# Patient Record
Sex: FEMALE | Race: WHITE | Employment: FULL TIME | URBAN - METROPOLITAN AREA
[De-identification: names, ages, dates, MRNs, and addresses within clinical notes are randomized per-mention and may not be internally consistent; named-entity substitution may affect disease eponyms.]

---

## 2022-08-16 ENCOUNTER — HOSPITAL ENCOUNTER (EMERGENCY)
Age: 22
Discharge: HOME | End: 2022-08-16
Attending: EMERGENCY MEDICINE
Payer: COMMERCIAL

## 2022-08-16 ENCOUNTER — APPOINTMENT (OUTPATIENT)
Dept: GENERAL RADIOLOGY | Age: 22
End: 2022-08-16
Payer: COMMERCIAL

## 2022-08-16 VITALS
HEIGHT: 64 IN | RESPIRATION RATE: 17 BRPM | OXYGEN SATURATION: 98 % | BODY MASS INDEX: 26.63 KG/M2 | TEMPERATURE: 98 F | HEART RATE: 94 BPM | DIASTOLIC BLOOD PRESSURE: 89 MMHG | SYSTOLIC BLOOD PRESSURE: 122 MMHG | WEIGHT: 156 LBS

## 2022-08-16 DIAGNOSIS — S61.315A LACERATION OF LEFT RING FINGER WITHOUT FOREIGN BODY WITH DAMAGE TO NAIL, INITIAL ENCOUNTER: ICD-10-CM

## 2022-08-16 DIAGNOSIS — S62.635B OPEN DISPLACED FRACTURE OF DISTAL PHALANX OF LEFT RING FINGER, INITIAL ENCOUNTER: Primary | ICD-10-CM

## 2022-08-16 PROCEDURE — 6370000000 HC RX 637 (ALT 250 FOR IP): Performed by: EMERGENCY MEDICINE

## 2022-08-16 PROCEDURE — 73130 X-RAY EXAM OF HAND: CPT

## 2022-08-16 PROCEDURE — 96372 THER/PROPH/DIAG INJ SC/IM: CPT

## 2022-08-16 PROCEDURE — 6360000002 HC RX W HCPCS: Performed by: EMERGENCY MEDICINE

## 2022-08-16 PROCEDURE — 99284 EMERGENCY DEPT VISIT MOD MDM: CPT

## 2022-08-16 RX ORDER — CEPHALEXIN 500 MG/1
500 CAPSULE ORAL 4 TIMES DAILY
Qty: 28 CAPSULE | Refills: 0 | Status: SHIPPED | OUTPATIENT
Start: 2022-08-16 | End: 2022-08-23

## 2022-08-16 RX ORDER — IBUPROFEN 600 MG/1
600 TABLET ORAL ONCE
Status: COMPLETED | OUTPATIENT
Start: 2022-08-16 | End: 2022-08-16

## 2022-08-16 RX ORDER — OXYCODONE HYDROCHLORIDE 5 MG/1
5 TABLET ORAL EVERY 8 HOURS PRN
Qty: 9 TABLET | Refills: 0 | Status: SHIPPED | OUTPATIENT
Start: 2022-08-16 | End: 2022-08-19

## 2022-08-16 RX ORDER — FENTANYL CITRATE 50 UG/ML
50 INJECTION, SOLUTION INTRAMUSCULAR; INTRAVENOUS ONCE
Status: COMPLETED | OUTPATIENT
Start: 2022-08-16 | End: 2022-08-16

## 2022-08-16 RX ORDER — CEFAZOLIN SODIUM 1 G/3ML
1000 INJECTION, POWDER, FOR SOLUTION INTRAMUSCULAR; INTRAVENOUS ONCE
Status: COMPLETED | OUTPATIENT
Start: 2022-08-16 | End: 2022-08-16

## 2022-08-16 RX ADMIN — CEFAZOLIN 1000 MG: 1 INJECTION, POWDER, FOR SOLUTION INTRAMUSCULAR; INTRAVENOUS at 13:54

## 2022-08-16 RX ADMIN — IBUPROFEN 600 MG: 600 TABLET, FILM COATED ORAL at 12:57

## 2022-08-16 RX ADMIN — FENTANYL CITRATE 50 MCG: 50 INJECTION INTRAMUSCULAR; INTRAVENOUS at 13:15

## 2022-08-16 ASSESSMENT — PAIN SCALES - GENERAL
PAINLEVEL_OUTOF10: 7
PAINLEVEL_OUTOF10: 7
PAINLEVEL_OUTOF10: 3
PAINLEVEL_OUTOF10: 3
PAINLEVEL_OUTOF10: 2

## 2022-08-16 ASSESSMENT — PAIN - FUNCTIONAL ASSESSMENT: PAIN_FUNCTIONAL_ASSESSMENT: 0-10

## 2022-08-16 ASSESSMENT — PAIN DESCRIPTION - LOCATION
LOCATION: FINGER (COMMENT WHICH ONE)
LOCATION: FINGER (COMMENT WHICH ONE)

## 2022-08-16 ASSESSMENT — PAIN DESCRIPTION - ORIENTATION
ORIENTATION: LEFT
ORIENTATION: LEFT

## 2022-08-16 NOTE — LETTER
[facility] [address]  Phone: [phone]               August 16, 2022    Patient: Burton Mercer   YOB: 2000   Date of Visit: 8/16/2022       To Whom It May Concern:    Burton Mercer was seen and treated in our emergency department on 8/16/2022. No heavy lifting until cleared by orthopaedics.      Sincerely,             Signature:__________________________________

## 2022-08-16 NOTE — ED PROVIDER NOTES
in the morning and 1 capsule at noon and 1 capsule in the evening and 1 capsule before bedtime. Do all this for 7 days. 28 capsule 0     No Known Allergies    REVIEW OF SYSTEMS  10 systems reviewed, pertinent positives and negatives per HPI, otherwise noted to be negative. PHYSICAL EXAM  ED Triage Vitals [08/16/22 1230]   BP Temp Temp Source Heart Rate Resp SpO2 Height Weight   (!) 138/94 98 °F (36.7 °C) Tympanic (!) 110 17 98 % -- --     General appearance: Awake and alert. Cooperative. Tearful. HENT: Normocephalic. Atraumatic. Mucous membranes are moist.  Neck: Supple. Eyes: PERRL. EOMI. Heart/Chest: Tachy rate, regular rhythm. No murmurs. Lungs: Respirations unlabored. CTAB. Good air exchange. Speaking comfortably in full sentences. Abdomen: Soft. Non-tender. Non-distended. No rebound or guarding. Musculoskeletal: L hand: there is an injury to the distal left index finger w/ associated edema and laceration extending to the nail bed:        Skin: Warm and dry. No acute rashes except as above. Neurological: Alert and oriented. CN II-XII intact. Strength 5/5 bilateral upper and lower extremities. Sensation intact to light touch. Gait normal.  Psychiatric: Mood/affect: normal      LABS  I have reviewed all labs for this visit. No results found for this visit on 08/16/22. RADIOLOGY  I have reviewed all radiographic studies for this visit. XR HAND LEFT (MIN 3 VIEWS)   Final Result   Acute displaced fracture of the tuft of the left 4th digit with overlying   soft tissue swelling. ED COURSE/MDM  Patient seen and evaluated. Old records reviewed. Labs and imaging reviewed and results discussed with patient/family to extent possible. This is a 79-year-old female presenting with crush injury/laceration to the left fourth digit. X-ray shows an acute displaced fracture of the tuft of the left fourth digit with overlying soft tissue swelling. This is an open injury.   The laceration extends to the nailbed. Tetanus status is up-to-date. Ibuprofen and fentanyl for pain control. Ancef for prophylaxis. Wound was cleaned. The fingernail was removed and the nailbed laceration was repaired. The laceration lateral to the nailbed was repaired. The removed fingernail was cleaned and then replaced in the eponychial fold and secured with Dermabond. A dressing was applied. A splint was applied. Case was discussed with orthopedic surgery. Follow-up with Dr. Arlen Mena in the outpatient setting. Keflex for prophylaxis. Ibuprofen/APAP for pain control with oxycodone for breakthrough pain. Usual opiate precautions. Usual strict return precautions for new or worsening symptoms communicated. Is this patient to be included in the SEP-1 Core Measure? No   Exclusion criteria - the patient is NOT to be included for SEP-1 Core Measure due to: Infection is not suspected    Lorenzo MANDUJANO MD, am the primary clinician of record. During the patient's ED course, the patient was given:  Medications   ibuprofen (ADVIL;MOTRIN) tablet 600 mg (600 mg Oral Given 8/16/22 1257)   fentaNYL (SUBLIMAZE) injection 50 mcg (50 mcg IntraMUSCular Given 8/16/22 1315)   ceFAZolin (ANCEF) injection 1,000 mg (1,000 mg IntraMUSCular Given 8/16/22 1354)        All questions were answered and the patient/family expressed understanding and agreement with the plan. PROCEDURES  Laceration repair and removal of fingernail, left fourth digit  Informed consent was obtained by patient and her father, present at bedside. The wound was prepped and draped in the usual sterile fashion. A digital nerve block was performed with a 27-gauge needle and 1% lidocaine without epinephrine to good effect. Hemostasis was achieved with use of a finger tourniquet. As there was a nailbed laceration, the fingernail of the affected digit was undermined with suture scissors and removed with traction.   The nailbed laceration was repaired with simple interrupted 5-0 Chromic Gut sutures. The laceration extended lateral to the fingernail bed and was repaired with simple interrupted 5-0 Prolene sutures. The removed fingernail was cleaned and trimmed and placed in the eponychial fold and secured with Dermabond. The finger tourniquet was removed and hemostasis was noted. The wound was dressed with tube gauze. The digit was placed in an aluminum splint. Patient tolerated the procedure well with no immediate complications. EBL <5 mL. CRITICAL CARE  N/A    CLINICAL IMPRESSION  1. Open displaced fracture of distal phalanx of left ring finger, initial encounter    2. Laceration of left ring finger without foreign body with damage to nail, initial encounter        DISPOSITION   discharge     Mary Jane Guzman MD    Note: This chart was created using voice recognition dictation software. Efforts were made by me to ensure accuracy, however some errors may be present due to limitations of this technology and occasionally words are not transcribed correctly.         Mary Jane Guzman MD  08/16/22 9353 done

## 2022-08-16 NOTE — ED NOTES
Pt discharged from ED to home. Pt verbalizes understanding to discharge instructions, teach back successful. Pt denies questions at this time. No acute distress noted. Resp even and unlabored. A/ox4. Pt instructed to follow-up as noted - return to ED if symptoms worsen. Pt verbalizes understanding. Discharged per ED MD with discharge instructions. Pt refuses ambulatory assistance to lobby and walks with steady gait.         Cathryne Barthel, RN  08/16/22 2771

## 2022-08-19 ENCOUNTER — OFFICE VISIT (OUTPATIENT)
Dept: ORTHOPEDIC SURGERY | Age: 22
End: 2022-08-19
Payer: COMMERCIAL

## 2022-08-19 VITALS — WEIGHT: 156 LBS | HEIGHT: 64 IN | RESPIRATION RATE: 16 BRPM | BODY MASS INDEX: 26.63 KG/M2

## 2022-08-19 DIAGNOSIS — S62.635A CLOSED DISPLACED FRACTURE OF DISTAL PHALANX OF LEFT RING FINGER, INITIAL ENCOUNTER: Primary | ICD-10-CM

## 2022-08-19 PROCEDURE — 99204 OFFICE O/P NEW MOD 45 MIN: CPT | Performed by: PHYSICIAN ASSISTANT

## 2022-08-19 PROCEDURE — 26750 TREAT FINGER FRACTURE EACH: CPT | Performed by: PHYSICIAN ASSISTANT

## 2022-08-19 NOTE — PROGRESS NOTES
Ms. Manjit Hidalgo is a 25 y.o. left handed student  who is seen today in Hand Surgical Consultation at the request of Sang Ramirez MD.    She is seen today regarding an injury occurring on August 16th, 2022. She reports injuring her left Ring Finger, having  smashed it between a boat and dock while at work. At the time of injury, there was not clear dislocation or malposition of the finger. She was seen for Emergency evaluation elsewhere, radiographs were obtained & she has been immobilized. By report, her skin injury was repaired after the wound was thoroughly cleansed. The nail structures were involved in the injury and did require repair at the time. She reports mild pain located in the Distal aspect of the Ring Finger, no tenderness of the wrist or elbow. She notes today, no neurologic symptoms in the Ring Finger. Symptoms are improving over time. I have today reviewed with Manjit Hidalgo the clinically relevant, past medical history, medications, allergies,  family history, social history, and Review Of Systems & I have documented any details relevant to today's presenting complaints in my history above. Ms. Bradley Reno's self-reported past medical history, medications, allergies,  family history, social history, and Review Of Systems have been scanned into the chart under the \"Media\" tab. Physical Exam:  Ms. Nickie Villarreal most recent vitals:  Vitals  Resp: 16  Height: 5' 4\" (162.6 cm)  Weight: 156 lb (70.8 kg)    She is well nourished, oriented to person, place & time. She demonstrates appropriate mood and affect as well as normal gait and station. Skin: There is Oblique 1.5cm laceration on the Radial left Ring Finger closest to the Left, Ring Finger, DIP Joint which has been sutured. The nail plate is present but is resting on top of the nail bed not attached to anything. No other digits show sign of skin injury bilaterally.   Digital range of motion is limited by pain in the injured digit on the Left, normal on the Right. FDS function is Intact, FDP function is Intact, common extensor function is Intact. Wrist range of motion is Full bilaterally. Sensation is subjectively tingling in the zone of injury, objectively normal.  All other digits demonstrate normal sensation bilaterally. Vascular examination reveals normal, good capillary refill, and good color bilaterally. There is mild acute ecchymosis. Swelling is mild in the Ring Finger, all other digits demonstrate no evidence of swelling bilaterally. There is no evidence of gross joint instability bilaterally. Muscular strength is clinically appropriate bilaterally. Maximal pain is elicited with palpation of the distal region of the Ring Finger about the Left, Ring Finger, DIP Joint. The base of the hand & wrist are not tender to palpation. There is a 0 degree angular deformity and no clinical evidence of  mal-rotation of the injured digit. Radiographic Evaluation:  Radiographs, taken From a Hospital location outside of my practice were Personally Reviewed & Interpreted by myself today (2 views of the left Ring Finger). They do demonstrate evidence of an acute fracture of the Distal Phalanx of the Ring Finger. There is mild comminution, 0 degrees of angular deformity and no  mal-rotation. There is not evidence of other injury or bony fracture. Impression:  Ms. Raghav Puckett has sustained recent left Ring Finger injury with associated fracture. She  presents requesting further treatment. Plan:  I  have discussed with Ms. Raghav Puckett the various treatment options for treatment of her left Ring Finger tip injury.   We discussed the options of Conservative management allowing the soft tissues and bony structures to heal as well as possible and the functional consequences thereof, and Surgical Management in the form of Irrigation and debridement of the wound and associated injuries, and the repair of injured structures as possible (including skin, bone, nail structures, etc.). She has elected to proceed conservatively, voicing an understanding of the other options available to her. I have explained the complications, limitations, expectations, alternatives, & risks of her chosen treatment. We discussed the possibility of residual symptoms as may be related to conservative treatment of fractures including the possibilities of: infection, poor healing of bone, skin, nail bed, and other structures, persistant deformity, persistant pain, persistent sensory loss, limitation of motion, future arthritic symptoms & the possible need for further treatment. She understood our discussion and was comfortable with her decision; she was provided with appropriate expectations. I have discussed with Ms. Burton Mercer the various treatment options for treatment of her left Ring Finger Distal Phalanx fracture. We discussed the options of Conservative management of the fracture (accepting its current position and the functional consequences thereof), attempted closed reduction & cast immobilization (and the limitations which go along with cast immobilization), and Surgical Management in the form of Closed Reduction & Percutaneous Pinning  and/or Open Reduction & Internal Fixation of the fracture. She has elected to proceed conservatively, voicing an understanding of the other options available to her. I have explained the complications, limitations, expectations, alternatives, & risks of her chosen treatment. We discussed the possibility of residual symptoms as may be related to conservative treatment of fractures including the possibilities of: mal-union, non-union, delayed union, persistent deformity, persistent pain, limitation of motion, future arthritic symptoms & the possible need for further treatment. She understood our discussion and was comfortable with her decision; she was provided with appropriate expectations.     She is today fitted with a carefully applied, well padded & appropriately molded  stack splint . She was given a Patient Instruction Sheet related to cast care. I have asked her to schedule a follow-up appointment for 2 weeks from now at which time we will obtain repeat radiographs of the Ring Finger  out of splint/cast.    She is specifically instructed to contact the office between now & her scheduled appointment if she has concerns related to the cast or the underlying injury. She is welcome to call for an appointment sooner if she has any additional concerns or questions. I have also discussed with Ms. Claire Sharma  the other treatment options available to her  for this condition. We have today selected to proceed with conservative management. She and I have agreed that if our current course of conservative treatment does not prove to be effective over the short term future, that she will schedule a follow-up appointment to discuss and select an alternate course of therapy including possibly injection or surgical treatment. Ms. Claire Sharma has been given a full verbal list of instructions and precautions related to her present condition. I have asked her to followup with me in the office at the prescribed time. She is also specifically requested to call or return to the office sooner if her symptoms change or worsen prior to the next scheduled appointment.

## 2022-09-02 ENCOUNTER — OFFICE VISIT (OUTPATIENT)
Dept: ORTHOPEDIC SURGERY | Age: 22
End: 2022-09-02

## 2022-09-02 VITALS — WEIGHT: 154 LBS | BODY MASS INDEX: 26.29 KG/M2 | RESPIRATION RATE: 16 BRPM | HEIGHT: 64 IN

## 2022-09-02 DIAGNOSIS — S62.635A CLOSED DISPLACED FRACTURE OF DISTAL PHALANX OF LEFT RING FINGER, INITIAL ENCOUNTER: Primary | ICD-10-CM

## 2022-09-02 PROCEDURE — 99024 POSTOP FOLLOW-UP VISIT: CPT | Performed by: ORTHOPAEDIC SURGERY

## 2022-09-02 NOTE — PROGRESS NOTES
Ms. Anastacio Coffman returns today in follow-up of her recent left Ring Finger tip injury which occurred approximately 2 weeks ago. She has been treated with conservative wound care measures and gentle work on range of motion. She has noted decreased discomfort and decreased swelling. no symptoms of infection. She notes mild symptoms of numbness, tingling, no symptoms related to perfusion. I have today reviewed with Anastacio Coffman the clinically relevant, past medical history, medications, allergies,  family history, social history, and Review Of Systems & I have documented any details relevant to today's presenting complaints in my history above. Ms. Luda Reno's self-reported past medical history, medications, allergies,  family history, social history, and Review Of Systems have been scanned into the chart under the \"Media\" tab. Physical Exam:  Vitals  Resp: 16  Height: 5' 4\" (162.6 cm)  Weight: 154 lb (69.9 kg)  Ms. Anastacio Coffman appears well, she is in no apparent distress, she demonstrates appropriate mood & affect. Skin: Laceration is healing well, no significant drainage, no dehiscence Ring Finger on the Left, normal on the Right  Digits: stiff from immobilization Ring Finger on the Left, normal on the Right and otherwise normal bilaterally. Thumb shows Full range of motion bilaterally. FDS, FDP, and Common Extensor tendon function is intact to each digit. FPL & EPL tendon function is intact to the thumb. Wrist range of motion is without significant limitation bilaterally  There is no evidence of gross joint instability bilaterally. Sensation is present in the Ring Finger otherwise normal bilaterally   Vascular examination reveals normal and good capillary refill bilaterally  Swelling is moderate in the injured digit(s) on the Left, normal on the Right  There is no erythema, redness & induration at the injury site. Muscular strength is clinically appropriate bilaterally.       Radiographic Evaluation:  Radiographs were obtained today (2 views of the left Ring Finger finger). They demonstrate good maintenance of alignment of the joints, no rotational deformity. There is no sign of fracture displacement. Impression:  Ms. Riaza Collazo is doing fairly well after recent  left Ring Finger tip injury. It would appear that she has not fully healed her injury. Plan:  Ms. Raiza Collazo is instructed in the appropriate short term protection of her healing finger tip injury. We discussed the use of either a removable protective orthosis or activity adjustments as the situation demands. She is demonstrated the application and use of both devices. She is also instructed in work on Active & Passive range of motion of the digits, wrist, & elbow. These modalities were demonstrated to her today. We discussed the continued restrictions on the use of the injured hand and the limitations on resumption of activities until full range of motion and comfort have been regained. I have explained the time course and likely expectations for maximal recovery of motion and function. Ms. Christina Reno's sutures were removed today without difficulty. Skin remained well approximated. We discussed the option of pursuing formalized hand therapy and a prescription  was not indicated. I have asked Ms. Raiza Collazo to follow-up with me 2 weeks from now or to call me at that time if she has not been able to regain full painless range of motion and functional use of the injured extremity. She is also specifically instructed to return to the office or call for an appointment sooner if her symptoms are changing or worsening prior to that time.